# Patient Record
Sex: MALE | Race: NATIVE HAWAIIAN OR OTHER PACIFIC ISLANDER | HISPANIC OR LATINO | Employment: UNEMPLOYED | URBAN - METROPOLITAN AREA
[De-identification: names, ages, dates, MRNs, and addresses within clinical notes are randomized per-mention and may not be internally consistent; named-entity substitution may affect disease eponyms.]

---

## 2023-09-11 ENCOUNTER — HOSPITAL ENCOUNTER (OUTPATIENT)
Dept: RADIOLOGY | Facility: HOSPITAL | Age: 28
Discharge: HOME | End: 2023-09-11
Attending: ORTHOPAEDIC SURGERY

## 2023-09-11 DIAGNOSIS — T14.8XXA BONE FRACTURE: ICD-10-CM

## 2023-09-11 PROCEDURE — 73600 XR ANKLE 2 VIEW LEFT: ICD-10-PCS | Mod: 26,LT,, | Performed by: RADIOLOGY

## 2023-09-11 PROCEDURE — 73620 X-RAY EXAM OF FOOT: CPT | Mod: 26,LT,, | Performed by: RADIOLOGY

## 2023-09-11 PROCEDURE — 73600 X-RAY EXAM OF ANKLE: CPT | Mod: TC,LT

## 2023-09-11 PROCEDURE — 73600 X-RAY EXAM OF ANKLE: CPT | Mod: 26,LT,, | Performed by: RADIOLOGY

## 2023-09-11 PROCEDURE — 73620 XR FOOT 2 VIEW LEFT: ICD-10-PCS | Mod: 26,LT,, | Performed by: RADIOLOGY

## 2023-09-11 PROCEDURE — 73620 X-RAY EXAM OF FOOT: CPT | Mod: TC,LT

## 2023-09-26 ENCOUNTER — HOSPITAL ENCOUNTER (OUTPATIENT)
Dept: RADIOLOGY | Facility: HOSPITAL | Age: 28
Discharge: HOME | End: 2023-09-26
Attending: ORTHOPAEDIC SURGERY

## 2023-09-26 DIAGNOSIS — S92.312A: ICD-10-CM

## 2023-09-26 PROCEDURE — 73630 X-RAY EXAM OF FOOT: CPT | Mod: 26,LT,, | Performed by: RADIOLOGY

## 2023-09-26 PROCEDURE — 73630 XR FOOT COMPLETE 3 VIEW LEFT: ICD-10-PCS | Mod: 26,LT,, | Performed by: RADIOLOGY

## 2023-09-26 PROCEDURE — 73630 X-RAY EXAM OF FOOT: CPT | Mod: TC,LT

## 2023-10-10 ENCOUNTER — HOSPITAL ENCOUNTER (OUTPATIENT)
Dept: RADIOLOGY | Facility: HOSPITAL | Age: 28
Discharge: HOME | End: 2023-10-10
Attending: SPECIALIST

## 2023-10-10 DIAGNOSIS — S92.313A: ICD-10-CM

## 2023-10-10 PROCEDURE — 73630 X-RAY EXAM OF FOOT: CPT | Mod: 26,LT,, | Performed by: RADIOLOGY

## 2023-10-10 PROCEDURE — 73630 X-RAY EXAM OF FOOT: CPT | Mod: TC,LT

## 2023-10-10 PROCEDURE — 73630 XR FOOT COMPLETE 3 VIEW LEFT: ICD-10-PCS | Mod: 26,LT,, | Performed by: RADIOLOGY

## 2023-10-24 ENCOUNTER — CLINICAL SUPPORT (OUTPATIENT)
Dept: SMOKING CESSATION | Facility: CLINIC | Age: 28
End: 2023-10-24

## 2023-10-24 DIAGNOSIS — F17.200 NICOTINE DEPENDENCE: Primary | ICD-10-CM

## 2023-10-24 PROCEDURE — 99999 PR PBB SHADOW E&M-EST. PATIENT-LVL II: CPT | Mod: PBBFAC,,, | Performed by: SPEECH-LANGUAGE PATHOLOGIST

## 2023-10-24 PROCEDURE — 99404 PR PREVENT COUNSEL,INDIV,60 MIN: ICD-10-PCS | Mod: S$GLB,,, | Performed by: SPEECH-LANGUAGE PATHOLOGIST

## 2023-10-24 PROCEDURE — 99999 PR PBB SHADOW E&M-EST. PATIENT-LVL II: ICD-10-PCS | Mod: PBBFAC,,, | Performed by: SPEECH-LANGUAGE PATHOLOGIST

## 2023-10-24 PROCEDURE — 99404 PREV MED CNSL INDIV APPRX 60: CPT | Mod: S$GLB,,, | Performed by: SPEECH-LANGUAGE PATHOLOGIST

## 2023-10-24 RX ORDER — ASPIRIN/CALCIUM CARB/MAGNESIUM 325 MG
4 TABLET ORAL
Qty: 210 LOZENGE | Refills: 0 | Status: SHIPPED | OUTPATIENT
Start: 2023-10-24 | End: 2023-10-24

## 2023-10-24 RX ORDER — ASPIRIN/CALCIUM CARB/MAGNESIUM 325 MG
4 TABLET ORAL
Qty: 210 LOZENGE | Refills: 0 | Status: SHIPPED | OUTPATIENT
Start: 2023-10-24

## 2023-10-24 NOTE — PROGRESS NOTES
At SOC, patient reports smoking around 7 cigarettes per day. Assessed CO with patient reporting smoking  3  cigarettes prior. CO 24  Discussed the role of tobacco cessation program, role of NRT & behavioral changes to assist the patient to reach his goal of being tobacco free. The patient reports he is willing to utilize 21 mg patches on [employer] & 4 mg lozenges & will return for a follow up visit.  Education & instruction on the role of the NRT. Patient provided a written handout on usage of the NRT. The patient verbalized understanding & willingness to apply. Patient instructed to call [facility] anytime. Follow up visit set with the patient for 11/1/2023 at 6:00 pm. Patient's goal is to smoke less.     Patient states he doesn't know what medications he takes or his medical history other than having recent thumb & foot surgery. He reports he is currently staying in a residential facility at [address] [place] [address]; however he doesn't know the name of it. Unable to locate any name via [employer]. He states he will get his medical records sent to [facility]. Provided patient with a copy of the medical record release.

## 2023-10-25 ENCOUNTER — TELEPHONE (OUTPATIENT)
Dept: SMOKING CESSATION | Facility: CLINIC | Age: 28
End: 2023-10-25

## 2023-10-31 ENCOUNTER — CLINICAL SUPPORT (OUTPATIENT)
Dept: SMOKING CESSATION | Facility: CLINIC | Age: 28
End: 2023-10-31

## 2023-10-31 DIAGNOSIS — F17.200 NICOTINE DEPENDENCE: Primary | ICD-10-CM

## 2023-10-31 PROCEDURE — 99999 PR PBB SHADOW E&M-EST. PATIENT-LVL II: ICD-10-PCS | Mod: PBBFAC,,, | Performed by: SPEECH-LANGUAGE PATHOLOGIST

## 2023-10-31 PROCEDURE — 99404 PR PREVENT COUNSEL,INDIV,60 MIN: ICD-10-PCS | Mod: S$GLB,,, | Performed by: SPEECH-LANGUAGE PATHOLOGIST

## 2023-10-31 PROCEDURE — 99999 PR PBB SHADOW E&M-EST. PATIENT-LVL II: CPT | Mod: PBBFAC,,, | Performed by: SPEECH-LANGUAGE PATHOLOGIST

## 2023-10-31 PROCEDURE — 99404 PREV MED CNSL INDIV APPRX 60: CPT | Mod: S$GLB,,, | Performed by: SPEECH-LANGUAGE PATHOLOGIST

## 2023-10-31 NOTE — PROGRESS NOTES
Individual Follow-Up Form    10/31/2023    Quit Date: TBD    Clinical Status of Patient: Outpatient    Continuing Medication: yes  Nicotine Lozenges 2mg, (not using due to not liking them), he didn't buy any patches on amazon    Other Medications:      Target Symptoms: Withdrawal and medication side effects. The following were  rated moderate (3) to severe (4) on TCRS:  Moderate (3): difficulty concentrating, restless/can't sit still/impatient  Severe (4): desire/crave, insomnia    Comments: Telephone visit at 6:00 pm due to group is cancelled tomorrow. Patient reports he has cut down on smoking & at the time of this call, he reports smoking 3 cigarettes. Patient reports trying the lozenges; but didn't like them. He reports he didn't buy any patches on [employer]. Administered TCRS with patient reporting slight symptoms of anger/irritability/frustration, moderate symptoms of difficulty concentrating, restless/can't sit still/impatient & severe symptoms of desire/crave, insomnia. Discussed the role of patches & re-instructed patient on using GoodRx with a prescription to acquire locally. [facility] will communicate with patient's  Leana regarding patches. Discussed with patient isolating tobacco usage from activity such as coffee. Discussed staying inside to drink coffee. Discussed nicotine withdrawal, psychological dependency & sensory cues. Patient states his goal is to be at 1 cpd by next session. Challenged patient to view his cigarette usage & withdrawal experience as a hike in the forest due to the patient enjoys hiking. Patient states he is willing to apply. Follow up set for 11/1/2023 at 6:00 pm    Diagnosis: F17.200    Next Visit: 1 week

## 2023-11-01 ENCOUNTER — TELEPHONE (OUTPATIENT)
Dept: SMOKING CESSATION | Facility: CLINIC | Age: 28
End: 2023-11-01

## 2023-11-01 RX ORDER — SULFAMETHOXAZOLE AND TRIMETHOPRIM 800; 160 MG/1; MG/1
1 TABLET ORAL 2 TIMES DAILY
COMMUNITY

## 2023-11-01 RX ORDER — CHOLECALCIFEROL (VITAMIN D3) 1250 MCG
1250 TABLET ORAL ONCE AS NEEDED
COMMUNITY

## 2023-11-01 RX ORDER — ALENDRONATE SODIUM 70 MG/1
70 TABLET ORAL
COMMUNITY

## 2023-11-01 RX ORDER — MOXIFLOXACIN HYDROCHLORIDE 400 MG/1
400 TABLET ORAL ONCE AS NEEDED
COMMUNITY

## 2023-11-01 RX ORDER — RIFAMPIN 300 MG/1
300 CAPSULE ORAL ONCE AS NEEDED
COMMUNITY

## 2023-11-01 RX ORDER — MINOCYCLINE HYDROCHLORIDE 100 MG/1
100 CAPSULE ORAL ONCE AS NEEDED
COMMUNITY

## 2023-11-01 RX ORDER — FOLIC ACID 1 MG/1
1 TABLET ORAL DAILY
COMMUNITY

## 2023-11-01 RX ORDER — METHOTREXATE 2.5 MG/1
2.5 TABLET ORAL
COMMUNITY

## 2023-11-01 NOTE — TELEPHONE ENCOUNTER
Leana emailed to say they won't allow the van to be used after hours for the patient to attend group. Notified Leana [facility] can do a phone call on Monday 11/6/2023 at 6:00 pm with the patient

## 2023-11-06 ENCOUNTER — TELEPHONE (OUTPATIENT)
Dept: SMOKING CESSATION | Facility: CLINIC | Age: 28
End: 2023-11-06

## 2023-11-07 ENCOUNTER — CLINICAL SUPPORT (OUTPATIENT)
Dept: SMOKING CESSATION | Facility: CLINIC | Age: 28
End: 2023-11-07

## 2023-11-07 DIAGNOSIS — F17.200 NICOTINE DEPENDENCE: Primary | ICD-10-CM

## 2023-11-07 PROCEDURE — 99403 PREV MED CNSL INDIV APPRX 45: CPT | Mod: S$GLB,,, | Performed by: SPEECH-LANGUAGE PATHOLOGIST

## 2023-11-07 PROCEDURE — 99403 PR PREVENT COUNSEL,INDIV,45 MIN: ICD-10-PCS | Mod: S$GLB,,, | Performed by: SPEECH-LANGUAGE PATHOLOGIST

## 2023-11-07 PROCEDURE — 99999 PR PBB SHADOW E&M-EST. PATIENT-LVL II: CPT | Mod: PBBFAC,,, | Performed by: SPEECH-LANGUAGE PATHOLOGIST

## 2023-11-07 PROCEDURE — 99999 PR PBB SHADOW E&M-EST. PATIENT-LVL II: ICD-10-PCS | Mod: PBBFAC,,, | Performed by: SPEECH-LANGUAGE PATHOLOGIST

## 2023-11-07 NOTE — PROGRESS NOTES
Individual Follow-Up Form    11/7/2023    Quit Date: TBD    Clinical Status of Patient: Outpatient    Continuing Medication: yes  Nicotine Lozenges 4 mg     Other Medications: provided a 4 mg gum sample     Target Symptoms: Withdrawal and medication side effects. The following were  rated moderate (3) to severe (4) on TCRS:  Moderate (3): none  Severe (4): none    Comments: Telephone visit due to patient lacks transportation. Patient reports he's gotten himself down to 2 cigarettes. Praised the patient on his progress. He credits strategies of thinking about what he is doing & self talk as helping. He reports he didn't get any patches yet & that he doesn't like the taste of the lozenges. [facility] provided patient a 4 mg gum sample in a bag for him to  to try. Patient reports he will be leaving to go back to [place] next Wednesday. Discussed with patient alternative behaviors to including using bubble gum, trying the NRT gum sample, using his toothpicks & fidget items. Administered TCRS with patient reporting slight symptoms of desire/crave, increased appetite/hunger, anxious/nervous, restless/can't sit still/impatient. Discussed s/s of nicotine withdrawal. Discussed moving his smoking area to a spot that is farther away from the building that he is living in.  Patient states his goal is to be at 1 cpd or less by next session. Follow up set for 11/14/2023 at 4:00 pm.     Diagnosis: F17.200    Next Visit: 1 week

## 2023-11-14 ENCOUNTER — CLINICAL SUPPORT (OUTPATIENT)
Dept: SMOKING CESSATION | Facility: CLINIC | Age: 28
End: 2023-11-14

## 2023-11-14 DIAGNOSIS — F17.200 NICOTINE DEPENDENCE: Primary | ICD-10-CM

## 2023-11-14 PROCEDURE — 99999 PR PBB SHADOW E&M-EST. PATIENT-LVL II: CPT | Mod: PBBFAC,,, | Performed by: SPEECH-LANGUAGE PATHOLOGIST

## 2023-11-14 PROCEDURE — 99999 PR PBB SHADOW E&M-EST. PATIENT-LVL II: ICD-10-PCS | Mod: PBBFAC,,, | Performed by: SPEECH-LANGUAGE PATHOLOGIST

## 2023-11-14 PROCEDURE — 99403 PR PREVENT COUNSEL,INDIV,45 MIN: ICD-10-PCS | Mod: S$GLB,,, | Performed by: SPEECH-LANGUAGE PATHOLOGIST

## 2023-11-14 PROCEDURE — 99403 PREV MED CNSL INDIV APPRX 45: CPT | Mod: S$GLB,,, | Performed by: SPEECH-LANGUAGE PATHOLOGIST

## 2023-11-14 NOTE — PROGRESS NOTES
Individual Follow-Up Form    11/14/2023    Quit Date: 11/13/2023    Clinical Status of Patient: Outpatient    Continuing Medication: has lozenges & gum samples (doesn't like the lozenges)    Other Medications:      Target Symptoms: Withdrawal and medication side effects. The following were  rated moderate (3) to severe (4) on TCRS:  Moderate (3): none  Severe (4): anger/irritability/frustration, anxious/nervous, insomnia, restless/can't sit still/impatient    Comments: Telephone visit due to the patient doesn't have transportation. Patient reports he quit smoking Sunday 11/12/2023; but that he smoked 1 cigarette today at 2 pm stating he felt like he was having a panic attack. Praised the patient on his continued effort & willingness to go for his quit. Patient reports he didn't get the patches & that he doesn't like the lozenges & didn't think the gum sample worked for him. Administered TCRS with patient reporting slight symptoms of increased appetite, mild symptoms of desire/crave & severe symptoms of anger/irritability/frustration, anxious/nervous, insomnia, restless/can't sit still/impatient. Discussed with patient s/s of nicotine withdrawal & the role of NRT that could decrease the intensity of the patient's withdrawal symptoms at length. [facility] informed patient on how to access the products over the counter in addition to the cost savings with the usage of a Goodrx coupon & prescription. Informed the patient to notify [facility] if he decides that he wants [facility] to put in a prescription for him at a [facility] or [facility] which has the price point of around $26-28 dollars for patches. Patient verbalized understanding. Discussed with patient the impact of smoking on health & healing as the patient is set for another surgery in January. Patient reports he will be flying to [place] tomorrow & will be returning in January for another surgery. Discussed with patient utilizing his gum samples as well as if he is open to the lozenge  samples as a possible tool in addition to his cinnamon toothpicks for his flight as well as to acquire patches when he gets home to wear during & after his quit to help manage cravings.  Patient's goal is to be smoke free. Follow up set for 11/28/2023 at 6:00 pm.     Diagnosis: F17.200    Next Visit: 2 weeks

## 2023-11-28 ENCOUNTER — TELEPHONE (OUTPATIENT)
Dept: SMOKING CESSATION | Facility: CLINIC | Age: 28
End: 2023-11-28

## 2024-01-02 ENCOUNTER — TELEPHONE (OUTPATIENT)
Dept: SMOKING CESSATION | Facility: CLINIC | Age: 29
End: 2024-01-02

## 2024-01-17 ENCOUNTER — CLINICAL SUPPORT (OUTPATIENT)
Dept: SMOKING CESSATION | Facility: CLINIC | Age: 29
End: 2024-01-17

## 2024-01-17 DIAGNOSIS — F17.200 NICOTINE DEPENDENCE: Primary | ICD-10-CM

## 2024-01-17 PROCEDURE — 99999 PR PBB SHADOW E&M-EST. PATIENT-LVL I: CPT | Mod: PBBFAC,,,

## 2024-01-18 NOTE — PROGRESS NOTES
Called pt to f/u on his 3 month smoking cessation quit status. Pt stated he is still smoking, had quit for 2 weeks but then relapsed due to stress. Pt has been moving and busy dealing with life stressors, but stated his gf has been helping him quit. Informed him of benefit period, phone follow ups, and contact information. Will complete smart form and will continue to follow up on quit #1 episode.

## 2024-06-10 ENCOUNTER — HOSPITAL ENCOUNTER (OUTPATIENT)
Dept: RADIOLOGY | Facility: HOSPITAL | Age: 29
Discharge: HOME | End: 2024-06-10
Attending: HOSPITALIST

## 2024-06-10 DIAGNOSIS — S92.919A: ICD-10-CM

## 2024-06-10 PROCEDURE — 73660 X-RAY EXAM OF TOE(S): CPT | Mod: 26,LT,, | Performed by: RADIOLOGY

## 2024-06-10 PROCEDURE — 73660 X-RAY EXAM OF TOE(S): CPT | Mod: TC,LT

## 2024-11-01 ENCOUNTER — TELEPHONE (OUTPATIENT)
Dept: SMOKING CESSATION | Facility: CLINIC | Age: 29
End: 2024-11-01

## 2024-11-01 DIAGNOSIS — F17.200 NICOTINE DEPENDENCE: Primary | ICD-10-CM
